# Patient Record
Sex: MALE | Race: ASIAN | NOT HISPANIC OR LATINO | Employment: FULL TIME | URBAN - METROPOLITAN AREA
[De-identification: names, ages, dates, MRNs, and addresses within clinical notes are randomized per-mention and may not be internally consistent; named-entity substitution may affect disease eponyms.]

---

## 2019-06-02 ENCOUNTER — SURGERY - HEALTHEAST (OUTPATIENT)
Dept: SURGERY | Facility: HOSPITAL | Age: 29
End: 2019-06-02

## 2019-06-02 ENCOUNTER — ANESTHESIA - HEALTHEAST (OUTPATIENT)
Dept: SURGERY | Facility: HOSPITAL | Age: 29
End: 2019-06-02

## 2019-06-03 ASSESSMENT — MIFFLIN-ST. JEOR: SCORE: 1714.22

## 2019-06-06 ENCOUNTER — COMMUNICATION - HEALTHEAST (OUTPATIENT)
Dept: CARE COORDINATION | Facility: CLINIC | Age: 29
End: 2019-06-06

## 2019-06-09 ENCOUNTER — COMMUNICATION - HEALTHEAST (OUTPATIENT)
Dept: FAMILY MEDICINE | Facility: CLINIC | Age: 29
End: 2019-06-09

## 2019-06-09 DIAGNOSIS — B96.20 E COLI BACTEREMIA: ICD-10-CM

## 2019-06-09 DIAGNOSIS — R78.81 E COLI BACTEREMIA: ICD-10-CM

## 2019-09-07 ENCOUNTER — OFFICE VISIT - HEALTHEAST (OUTPATIENT)
Dept: FAMILY MEDICINE | Facility: CLINIC | Age: 29
End: 2019-09-07

## 2019-09-07 DIAGNOSIS — L24.1: ICD-10-CM

## 2019-09-07 RX ORDER — LORATADINE 10 MG/1
10 TABLET ORAL DAILY PRN
Qty: 30 TABLET | Refills: 0 | Status: SHIPPED | OUTPATIENT
Start: 2019-09-07

## 2019-09-07 RX ORDER — HYDROCORTISONE 2.5 %
CREAM (GRAM) TOPICAL
Qty: 30 G | Refills: 0 | Status: SHIPPED | OUTPATIENT
Start: 2019-09-07

## 2021-05-29 NOTE — ANESTHESIA CARE TRANSFER NOTE
Last vitals:   Vitals:    06/02/19 1945   BP: 122/76   Pulse: (!) 109   Resp: 24   Temp: (!) 38.6  C (101.4  F)   SpO2: 98%     Patient's level of consciousness is drowsy  Spontaneous respirations: yes  Maintains airway independently: yes  Dentition unchanged: yes  Oropharynx: oropharynx clear of all foreign objects    QCDR Measures:  ASA# 20 - Surgical Safety Checklist: WHO surgical safety checklist completed prior to induction    PQRS# 430 - Adult PONV Prevention: 4558F-8P - Pt did NOT receive => 2 anti-emetic agents; had already received 8 mg of zofran in the ED w/i 4 hours of surgery  ASA# 8 - Peds PONV Prevention: NA - Not pediatric patient, not GA or 2 or more risk factors NOT present  PQRS# 424 - Simona-op Temp Management: 4559F - At least one body temp DOCUMENTED => 35.5C or 95.9F within required timeframe  PQRS# 426 - PACU Transfer Protocol: - Transfer of care checklist used  ASA# 14 - Acute Post-op Pain: ASA14B - Patient did NOT experience pain >= 7 out of 10

## 2021-05-29 NOTE — TELEPHONE ENCOUNTER
Case was rerouted to me for review.    It appears that the Augmentin was not sent in previously.  I called patient a second time and was able to get a hold of them this time with the aid of an .  I discussed the case with him, discussed that perhaps he may not need antibiotics given that he is doing better at this point, but he wants to be safe and I do think that the standard of care would be to complete a course of antibiotics.  I sent in a prescription for ciprofloxacin x7 days as bacteria was resistant to Augmentin and Omnicef was too expensive.    Fredi Goodwin MD  [facility] Medicine Service  pager: [phone]

## 2021-05-29 NOTE — PROGRESS NOTES
TCM DISCHARGE FOLLOW UP CALL    Discharge Date:  6/5/2019  Reason for hospital stay (discharge diagnosis)::  Lap appy  Are you feeling better, the same or worse since your discharge?:  Patient is feeling better (Pain with transfers otherwise doing well. Incisions intact. No drainage.)  Do you feel like you have a plan in the event of a health emergency?: Yes (his family)    As part of your discharge plan, were  home care services ordered for you?: No    Did you receive any new medications, or was there a change to your medications?: Yes    Are you taking those medications, or do you have any established regiment?:  Pt didn't get the Vicodin from pharmacy. Pt states he doesn't have much pain and doesn't need it. Instructed pt he can take Tylenol for pain. RN notes in discharge summary that hospitalist was going to order Augmentin 7 day course. Call made to hospital coordinator to see if it can be ordered. Pt isn't established at [facility] yet and he declines to make an appt until MA goes through.  Do you have any follow up visits scheduled with your PCP or Specialist?:  No  I'm glad to hear you're doing well and we want you to continue to do well. Your PCP would like to see you for a follow-up visit. Can we help set that up for your today?: No    (RN) Provided patient the PCP's phone number to call if they have any questions or concerns?: Yes (Gave pt [facility] phone number to make est care INF appt.)

## 2021-05-29 NOTE — ANESTHESIA POSTPROCEDURE EVALUATION
Patient: Keshav Nguyen Michael  APPENDECTOMY, LAPAROSCOPIC  Anesthesia type: general    Patient location: PACU  Last vitals:   Vitals Value Taken Time   BP 90/47 6/2/2019 10:00 PM   Temp 36.6  C (97.8  F) 6/2/2019 10:00 PM   Pulse 110 6/2/2019 10:00 PM   Resp 20 6/2/2019 10:00 PM   SpO2 97 % 6/2/2019 10:00 PM     Post vital signs: stable  Level of consciousness: awake and responds to simple questions  Post-anesthesia pain: pain controlled  Post-anesthesia nausea and vomiting: no  Pulmonary: unassisted, return to baseline  Cardiovascular: stable and blood pressure at baseline  Hydration: adequate  Anesthetic events: no    QCDR Measures:  ASA# 11 - Simona-op Cardiac Arrest: ASA11B - Patient did NOT experience unanticipated cardiac arrest  ASA# 12 - Simona-op Mortality Rate: ASA12B - Patient did NOT die  ASA# 13 - PACU Re-Intubation Rate: ASA13B - Patient did NOT require a new airway mgmt  ASA# 10 - Composite Anes Safety: ASA10A - No serious adverse event    Additional Notes:    Received 500 ml of 5% albumin, 2L of LR in PACU for borderline hypotension w/ SBP high 80s and MAP 60s. Pt improved and SBP > 100 mmHg and MAP > 65 mmHg. Remained tachycardic and suspect 2/2 SIRS / inflammatory response.

## 2021-05-29 NOTE — ANESTHESIA PREPROCEDURE EVALUATION
Anesthesia Evaluation      Patient summary reviewed   No history of anesthetic complications     Airway   Mallampati: II  Neck ROM: full   Pulmonary - negative ROS and normal exam    breath sounds clear to auscultation                         Cardiovascular - negative ROS  Exercise tolerance: > or = 4 METS  (-) murmur  Rhythm: regular  Rate: normal,    no murmur      Neuro/Psych - negative ROS     Endo/Other - negative ROS      GI/Hepatic/Renal - negative ROS      Other findings: Labs 6/2/19:  WBC 5.8, Hgb 17.3, Plt 211  Na 137, K 4.0, BUN 12, Cr 0.98  T bili 1.5      Dental - normal exam                        Anesthesia Plan  Planned anesthetic: general endotracheal  GETA.  Modified RSI w/ rocuronium.  Decadron (10 mg) and zofran for PONV ppx.  Ketamine 30 mg for opioid sparing.  Ketorolac 30 mg at EOC.  ASA 1   Induction: intravenous   Anesthetic plan and risks discussed with: patient, sibling and  services used  Anesthesia plan special considerations: antiemetics,   Post-op plan: routine recovery

## 2021-06-03 VITALS — WEIGHT: 221.5 LBS | HEIGHT: 66 IN | BODY MASS INDEX: 35.6 KG/M2

## 2021-06-03 VITALS
BODY MASS INDEX: 34.06 KG/M2 | WEIGHT: 211 LBS | HEART RATE: 78 BPM | DIASTOLIC BLOOD PRESSURE: 84 MMHG | OXYGEN SATURATION: 97 % | TEMPERATURE: 98.2 F | SYSTOLIC BLOOD PRESSURE: 159 MMHG

## 2021-06-16 PROBLEM — A41.9 SEPSIS (H): Status: ACTIVE | Noted: 2019-06-04

## 2021-06-16 PROBLEM — K35.80 ACUTE APPENDICITIS: Status: ACTIVE | Noted: 2019-06-02

## 2021-06-17 NOTE — PATIENT INSTRUCTIONS - HE
Patient Instructions by Margret Ortiz MD at 9/7/2019 10:00 AM     Author: Margret Ortiz MD Service: -- Author Type: Physician    Filed: 9/7/2019 10:54 AM Encounter Date: 9/7/2019 Status: Addendum    : Margret Ortiz MD (Physician)    Related Notes: Original Note by Margret Ortiz MD (Physician) filed at 9/7/2019 10:52 AM       1. Avoid any substances or oil or grease which makes your skin itch  2. No need for other lotions or creams  3. If follow up is needed, try and be seen at your primary clinic. Or you can be seen by any primary care provider at one of our other [facility] sites  4. If you have any questions, call the clinic number - the number is answered 24/7      Patient Education     Understanding Contact Dermatitis     A cool, moist compress can help reduce itching.     Contact dermatitis is a common type of skin rash. Its caused by something that touches the skin and makes it irritated and inflamed. It can occur on skin on any part of the body, such as the face, neck, hands, arms, and legs. Contact dermatitis is not spread from person to person.  Often, the reaction of contact dermatitis occurs 1 to 2 days after contact with the offending agent.  How to say it  NAYELI-tact [id number]-tis   What causes contact dermatitis?  Its caused by something that irritates the skin, or that creates an allergic reaction on the skin. People can get contact dermatitis from many kinds of things. These include:    Plant oils in poison ivy, oak, and sumac    Chemicals in household , solvents, and glue    Chemicals in makeup, soap, laundry detergent, perfume, acne cream, and hair products    Certain medicines, such as neomycin, bacitracin, benzocaine, and thimerosal    Metals such as nickel, found in some jewelry and watch bands     The sticky material on the back of bandages and tape (adhesive)    Things that can cause tiny breaks in the skin, such as wood, fiberglass, metal tools, and plant  thorns    Rubber latex in surgical gloves and other medical supplies  Dermatitis can also be caused by the skin being damp for long periods of time. This can happen from washing your hands too often, or working with wet materials.  Symptoms of contact dermatitis  Symptoms can include skin that is:    Blistered    Burning    Cracked    Dry    Itchy    Painful    Red    Rough, thickened, and leathery    Swollen    Warm  The blisters may ooze fluid and form crusts.  Treatment for contact dermatitis  Treatment is done to help relieve itching and reduce inflammation. The rash should go away in a few days to a few weeks. Treatments include:    Cool, moist compress. Use a clean damp cloth. Put it on the area for 20 to 30 minutes, 5 to 6 times a day for the first 3 days.    Steroid cream or ointment. You can apply this medicine several times a day on clean skin.    Oral corticosteroid. Your healthcare provider may prescribe this medicine if you have severe skin symptoms on a large part of your body.  Your healthcare provider may give you a steroid injection instead of pills.    Oral antihistamine. This medicine can help reduce itching.    Colloidal oatmeal bath. Soaking in water with colloidal oatmeal can help soothe skin.    Plain cream, lotion, or ointment. Cream, lotion, or ointment without medicine can help to soothe and protect your skin.  Living with contact dermatitis  Talk with your healthcare provider about what may have caused your contact dermatitis. Patch testing may help you figure out what caused the rash so you can avoid further contact with it. Once you learn what caused your rash, make sure to avoid that substance. If your skin comes into contact with it again, make sure to wash your skin right away. If you cant avoid the substance, wear gloves or other protective clothing before you touch it. Or use a cream, lotion, or ointment to protect your skin.  When to call your healthcare provider  Call your  healthcare provider right away if you have any of these:    Fever of 100.4 F (38 C) or higher, or as directed    Symptoms that dont get better, or get worse    New symptoms   Date Last Reviewed: 5/1/2016 2000-2017 [employer]. [address] [place] [address], [place] [address]. All rights reserved. This information is not intended as a substitute for professional medical care. Always follow your healthcare professional's instructions.

## 2021-06-28 NOTE — PROGRESS NOTES
Progress Notes by Margret Ortiz MD at 9/7/2019 10:00 AM     Author: Margret Ortiz MD Service: -- Author Type: Physician    Filed: 9/8/2019  8:19 AM Encounter Date: 9/7/2019 Status: Signed    : Margret Ortiz MD (Physician)         Subjective:   Keshav Rodriguez is a 28 y.o. male and is new to [facility].  Accompanied by Other Brother jon rodriguez    services provided by: Family/Friend Keshav Strickland  brother   /Agency Name Other    Location of  Services: In person      Chief Complaint   Patient presents with   ? Rash     both hands and arms, itchy x 3 weeks   Patient states that he started having itching on the inside of both of his forearms about 3 weeks ago.  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  Denies having a problem with his skin in the past.  Says the bumps on his forearm where they are in the past but not the redness.    PMH -see problem list  FX - HTN - none, DM - none, Asthma - none, CAD - none, Cancer - none  SX - Single, works in factory    Past Surgical History:   Procedure Laterality Date   ? ND LAP,APPENDECTOMY N/A 6/2/2019    Procedure: APPENDECTOMY, LAPAROSCOPIC;  Surgeon: Deep Cary MD;  Location: [facility];  Service: General     Patient Active Problem List   Diagnosis   ? Acute appendicitis   ? Sepsis (H)     Social History     Tobacco Use   ? Smoking status: Never Smoker   ? Smokeless tobacco: Never Used   Substance Use Topics   ? Alcohol use: Never     Frequency: Never   ? Drug use: Never      Review of Systems  See HPI for ROS, otherwise balance of other systems negative  No Known Allergies    Current Outpatient Medications:   ?  HYDROcodone-acetaminophen 5-325 mg per tablet, Take 1 tablet by mouth every 4 (four) hours as needed., Disp: 12 tablet, Rfl: 0    Objective:     Vitals:    09/07/19 1004   BP: 159/84   Pulse: 78    Temp: 98.2  F (36.8  C)   TempSrc: Oral   SpO2: 97%   Weight: 211 lb (95.7 kg)   Vital signed reviewed  Gen - Pt in NAD  Eyes - PERRL, EOMI, Conjunctiva without injection or drainage  Ears - hearing intact  Nose - non congested, no nasal drainage, turbinates not inflamed, septum not deviated  Pharynx - not injected, tonsils 1+ size  Neck - supple, no cervical adenopathy, no masses  Cor - RRR w/o murmur  Lungs - Good air entry; no wheezes or crackles noted on auscultation - no coughing noted  Neuro - non focal  Psych - Affect - Euthymic, well groomed, speech not pressured, good insight, no flight of ideas  Skin -on the medial side of both forearms is a diffuse is diffuse very slight erythema with no papules or induration     Assessment - Plan   Medical Decision Making -28-year-old man new to [facility] presents with itching on both of his forearms for 3 weeks after exposure to a certain oil at work.  Patient only had some pale erythema on exam.  Patient's initial blood pressure was 159/84 and it was not repeated.  On discussed the patient that he most likely has some sort of contact dermatitis.  Prescribed a higher strength hydrocortisone cream with loratadine.  Patient said he wanted to wait and see if he got better before following up with primary care.  He was told that if things did not improve over the next 4 days to follow-up with primary care.    1. Irritant contact dermatitis due to grease  - loratadine (CLARITIN) 10 mg tablet; Take 1 tablet (10 mg total) by mouth daily as needed for allergies (itching).  Dispense: 30 tablet; Refill: 0  - hydrocortisone 2.5 % cream; Apply 0.25 grams ( thin layer) of cream to affected areas on both arms up to 3 times as day as needed for itching  Dispense: 30 g; Refill: 0    At the conclusion of the encounter, assessment and plan were discussed. All questions were answered. The patient or guardian acknowledged understanding and was involved in the decision making  regarding the overall care plan.    Patient Instructions     1. Avoid any substances or oil or grease which makes your skin itch  2. No need for other lotions or creams  3. If follow up is needed, try and be seen at your primary clinic. Or you can be seen by any primary care provider at one of our other [facility] sites  4. If you have any questions, call the clinic number - the number is answered 24/7      Patient Education     Understanding Contact Dermatitis     A cool, moist compress can help reduce itching.     Contact dermatitis is a common type of skin rash. Its caused by something that touches the skin and makes it irritated and inflamed. It can occur on skin on any part of the body, such as the face, neck, hands, arms, and legs. Contact dermatitis is not spread from person to person.  Often, the reaction of contact dermatitis occurs 1 to 2 days after contact with the offending agent.  How to say it  NAYELI-tact [id number]-tis   What causes contact dermatitis?  Its caused by something that irritates the skin, or that creates an allergic reaction on the skin. People can get contact dermatitis from many kinds of things. These include:    Plant oils in poison ivy, oak, and sumac    Chemicals in household , solvents, and glue    Chemicals in makeup, soap, laundry detergent, perfume, acne cream, and hair products    Certain medicines, such as neomycin, bacitracin, benzocaine, and thimerosal    Metals such as nickel, found in some jewelry and watch bands     The sticky material on the back of bandages and tape (adhesive)    Things that can cause tiny breaks in the skin, such as wood, fiberglass, metal tools, and plant thorns    Rubber latex in surgical gloves and other medical supplies  Dermatitis can also be caused by the skin being damp for long periods of time. This can happen from washing your hands too often, or working with wet materials.  Symptoms of contact dermatitis  Symptoms can include skin that  is:    Blistered    Burning    Cracked    Dry    Itchy    Painful    Red    Rough, thickened, and leathery    Swollen    Warm  The blisters may ooze fluid and form crusts.  Treatment for contact dermatitis  Treatment is done to help relieve itching and reduce inflammation. The rash should go away in a few days to a few weeks. Treatments include:    Cool, moist compress. Use a clean damp cloth. Put it on the area for 20 to 30 minutes, 5 to 6 times a day for the first 3 days.    Steroid cream or ointment. You can apply this medicine several times a day on clean skin.    Oral corticosteroid. Your healthcare provider may prescribe this medicine if you have severe skin symptoms on a large part of your body.  Your healthcare provider may give you a steroid injection instead of pills.    Oral antihistamine. This medicine can help reduce itching.    Colloidal oatmeal bath. Soaking in water with colloidal oatmeal can help soothe skin.    Plain cream, lotion, or ointment. Cream, lotion, or ointment without medicine can help to soothe and protect your skin.  Living with contact dermatitis  Talk with your healthcare provider about what may have caused your contact dermatitis. Patch testing may help you figure out what caused the rash so you can avoid further contact with it. Once you learn what caused your rash, make sure to avoid that substance. If your skin comes into contact with it again, make sure to wash your skin right away. If you cant avoid the substance, wear gloves or other protective clothing before you touch it. Or use a cream, lotion, or ointment to protect your skin.  When to call your healthcare provider  Call your healthcare provider right away if you have any of these:    Fever of 100.4 F (38 C) or higher, or as directed    Symptoms that dont get better, or get worse    New symptoms   Date Last Reviewed: 5/1/2016 2000-2017 [employer]. [address] [place] [address], [place] [address]. All rights  reserved. This information is not intended as a substitute for professional medical care. Always follow your healthcare professional's instructions.